# Patient Record
Sex: FEMALE | Race: WHITE | NOT HISPANIC OR LATINO | Employment: UNEMPLOYED | URBAN - METROPOLITAN AREA
[De-identification: names, ages, dates, MRNs, and addresses within clinical notes are randomized per-mention and may not be internally consistent; named-entity substitution may affect disease eponyms.]

---

## 2017-05-12 ENCOUNTER — GENERIC CONVERSION - ENCOUNTER (OUTPATIENT)
Dept: OTHER | Facility: OTHER | Age: 37
End: 2017-05-12

## 2017-06-17 ENCOUNTER — GENERIC CONVERSION - ENCOUNTER (OUTPATIENT)
Dept: OTHER | Facility: OTHER | Age: 37
End: 2017-06-17

## 2017-08-25 ENCOUNTER — GENERIC CONVERSION - ENCOUNTER (OUTPATIENT)
Dept: OTHER | Facility: OTHER | Age: 37
End: 2017-08-25

## 2017-12-11 ENCOUNTER — GENERIC CONVERSION - ENCOUNTER (OUTPATIENT)
Dept: FAMILY MEDICINE CLINIC | Facility: CLINIC | Age: 37
End: 2017-12-11

## 2018-01-10 NOTE — MISCELLANEOUS
Chief Complaint  I called patient  She is seeing dietitian Kristi Barajas with [address]  We received a letter stating that patient needs labs:  CMP,  magnesium,  phosphorus, celiac  disease panel  Patient has Crohn's disease, followed by  gastroenterologist Dr Mabel Eisenmenger,  last seen in July 2016  Recommended patient to schedule annual physical exam  Will mail order for blood work to patient's home       Signatures   Electronically signed by : FRANK Prakash ; Aug 23 2016 12:22PM EST                       (Author)

## 2018-01-12 NOTE — RESULT NOTES
Message  I call patient with blood work results  Blood test showed elevated alkaline phosphatase, ALT and AST levels  Patient has Crohn's disease  She has been followed by gastroenterologist Dr Hardy Sainz  She has follow-up visit  this month  Recommended to discuss elevation of liver enzymes with gastroenterology  Non-fasting blood sugar 156  Patient was advised to check fasting blood sugar, check  Hemoglobin A1c to rule out prediabetes  Celiac disease panel is negative  Will mail slip for blood test and copies of blood work to patient  Verified Results  (Q) CELIAC DISEASE COMPREHENSIVE PANEL [id number] 11:41AM Eduardo Mccarty     Test Name Result Flag Reference   INTERPRETATION      No serological evidence of celiac disease  tTG IgA may normalize in individuals with celiac disease  who maintain a gluten-free diet  Consider HLA DQ2 and   DQ8 testing to rule out celiac disease   Celiac disease   is extremely rare in the absence of DQ2 or DQ8    TISSUE TRANSGLUTAMINASE$ANTIBODY, IGA 1 U/mL     Value      Interpretation         -----      --------------         <4         No Antibody Detected         > or = 4   Antibody Detected   IMMUNOGLOBULIN A 137 mg/dL         Signatures   Electronically signed by : FRANK Saavedra ; Sep  6 2016  1:59PM EST                       (Author)

## 2018-01-12 NOTE — MISCELLANEOUS
History of Present Illness  TCM Communication [facility]: She was hospitalized at [facility]  The date of admission: 3/9/16, date of discharge: 3/11/16  Diagnosis: Crohn's Disease  She was discharged to home  She did not schedule a follow up appointment  Follow-up appointments with other specialists: Followup with Dr Aleisha Beatty  Communication performed and completed by Aubrey Mcgraw      Active Problems    1  Acute upper respiratory infection (465 9) (J06 9)   2  Crohn's colitis (555 1) (K50 10)   3  Immunosuppressives   4  Skin mole (216 9) (D22 9)    Past Medical History    1  History of Acne vulgaris (706 1) (L70 0)   2  History of Acute pharyngitis (462) (J02 9)   3  History of Ankle pain, unspecified laterality    Surgical History    1  Immunosuppressives    Social History    · Never a smoker   · No alcohol use   · No drug use    Current Meds   1  Delzicol 400 MG Oral Capsule Delayed Release; Therapy: 22IJH5093 to (Evaluate:07Gqb6790) Recorded   2  Erythromycin 2 % External Gel; APPLY TO AFFECTED AREA TWICE DAILY; Therapy: 74EUU8744 to (Evaluate:13Mar2016)  Requested for: 39XTC3133; Last   Rx:[id number] Ordered   3  Humira Pen-Crohns Starter 40 MG/0 8ML KIT; Therapy: 80CEM0770 to (Evaluate:31Ywz2842) Recorded    Allergies    1   Amoxicillin CAPS    Signatures   Electronically signed by : FRANK Darden ; Mar 14 2016 12:31PM EST                       (Author)

## 2018-01-13 NOTE — PROGRESS NOTES
Chief Complaint  Pt came to office to have done PPD test prior starting of new medication to be managed by Dr Alek Vega medication is Stelara to treat Crohn disease  PPD administered L forearm lot # [id number] exp 10/14/18      Active Problems    1  Acute upper respiratory infection (465 9) (J06 9)   2  Borderline hyperglycemia (790 29) (R73 9)   3  Crohn's colitis (555 1) (K50 10)   4  Immunosuppressives   5  Screening for tuberculosis (V74 1) (Z11 1)   6  Skin mole (216 9) (D22 9)    Current Meds   1  Delzicol 400 MG Oral Capsule Delayed Release; Therapy: 22WNL6629 to (Evaluate:47Rek0152) Recorded   2  Erythromycin 2 % External Gel; APPLY TO AFFECTED AREA TWICE DAILY; Therapy: 58Dit0448 to (Evaluate:38Dch2077)  Requested for: 73NPT1366; Last   Rx:[id number] Ordered   3  Humira Pen-Crohns Starter 40 MG/0 8ML KIT; Therapy: 02NNM4243 to (Evaluate:74Beg9735) Recorded    Allergies    1   Amoxicillin CAPS    Plan  Screening for tuberculosis    · Tubersol 5 UNIT/0 1ML Intradermal Solution    Future Appointments    Date/Time Provider Specialty Site   11/07/2016 08:30 AM [facility], Nurse Schedule  [facility]     Signatures   Electronically signed by : FRANK Lo ; Nov 4 2016  1:03PM EST                       (Author)

## 2018-01-17 NOTE — PROGRESS NOTES
Chief Complaint  Patient presents for her PPD read today  The PPD test read negative and patient is cleared to being Stelara for treatment of her Crohn's Disease  Teofilo Holter, RMA      Active Problems    1  Acute upper respiratory infection (465 9) (J06 9)   2  Borderline hyperglycemia (790 29) (R73 9)   3  Crohn's colitis (555 1) (K50 10)   4  Immunosuppressives   5  Screening for tuberculosis (V74 1) (Z11 1)   6  Skin mole (216 9) (D22 9)    Current Meds   1  Delzicol 400 MG Oral Capsule Delayed Release; Therapy: 83UPU6662 to (Evaluate:99Hix7294) Recorded   2  Erythromycin 2 % External Gel; APPLY TO AFFECTED AREA TWICE DAILY; Therapy: 09Ugb0040 to (Evaluate:03Tbp0367)  Requested for: 59QCQ5679; Last   Rx:[id number] Ordered   3  Humira Pen-Crohns Starter 40 MG/0 8ML KIT; Therapy: 12BDJ0598 to (Evaluate:73Ead5275) Recorded    Allergies    1   Amoxicillin CAPS    Signatures   Electronically signed by : FRANK Fry ; Nov 7 2016  5:35PM EST                       (Author)

## 2019-12-03 ENCOUNTER — DOCTOR'S OFFICE (OUTPATIENT)
Dept: URBAN - METROPOLITAN AREA CLINIC 136 | Facility: CLINIC | Age: 39
Setting detail: OPHTHALMOLOGY
End: 2019-12-03
Payer: MEDICARE

## 2019-12-03 DIAGNOSIS — H40.033: ICD-10-CM

## 2019-12-03 DIAGNOSIS — H53.123: ICD-10-CM

## 2019-12-03 PROCEDURE — 92002 INTRM OPH EXAM NEW PATIENT: CPT | Performed by: OPHTHALMOLOGY

## 2019-12-03 ASSESSMENT — CONFRONTATIONAL VISUAL FIELD TEST (CVF)
OS_FINDINGS: FULL
OD_FINDINGS: CONSTRICTION

## 2019-12-06 ASSESSMENT — REFRACTION_MANIFEST
OU_VA: 20/
OD_VA3: 20/
OD_VA2: 20/
OD_VA2: 20/
OU_VA: 20/
OS_VA1: 20/
OD_VA3: 20/
OS_VA2: 20/
OS_VA2: 20/
OS_VA3: 20/
OD_VA1: 20/
OS_VA1: 20/
OD_VA1: 20/
OS_VA3: 20/

## 2019-12-06 ASSESSMENT — REFRACTION_CURRENTRX
OS_OVR_VA: 20/
OS_CYLINDER: SPHERE
OD_CYLINDER: -0.50
OS_OVR_VA: 20/
OD_OVR_VA: 20/
OD_OVR_VA: 20/
OS_OVR_VA: 20/
OS_SPHERE: -2.75
OD_OVR_VA: 20/
OS_VPRISM_DIRECTION: SV
OD_SPHERE: -2.25
OD_VPRISM_DIRECTION: SV
OD_AXIS: 140

## 2019-12-06 ASSESSMENT — REFRACTION_AUTOREFRACTION
OS_CYLINDER: -0.25
OS_AXIS: 015
OD_SPHERE: -2.25
OD_CYLINDER: SPHERE
OS_SPHERE: -2.50

## 2019-12-06 ASSESSMENT — SPHEQUIV_DERIVED: OS_SPHEQUIV: -2.625

## 2019-12-06 ASSESSMENT — VISUAL ACUITY
OS_BCVA: 20/20
OD_BCVA: 20/20

## 2019-12-12 PROBLEM — H53.123 SUBJECTIVE VISUAL DISTURBANCE; BOTH EYES: Status: ACTIVE | Noted: 2019-12-03

## 2019-12-12 PROBLEM — H40.031 NARROW ANGLE; RIGHT EYE, LEFT EYE: Status: ACTIVE | Noted: 2019-12-03

## 2019-12-12 PROBLEM — H40.032 NARROW ANGLE; RIGHT EYE, LEFT EYE: Status: ACTIVE | Noted: 2019-12-03

## 2021-04-08 DIAGNOSIS — Z23 ENCOUNTER FOR IMMUNIZATION: ICD-10-CM
